# Patient Record
Sex: FEMALE | URBAN - NONMETROPOLITAN AREA
[De-identification: names, ages, dates, MRNs, and addresses within clinical notes are randomized per-mention and may not be internally consistent; named-entity substitution may affect disease eponyms.]

---

## 2024-07-08 ENCOUNTER — APPOINTMENT (OUTPATIENT)
Dept: PRIMARY CARE | Facility: CLINIC | Age: 34
End: 2024-07-08

## 2024-07-08 VITALS
SYSTOLIC BLOOD PRESSURE: 120 MMHG | WEIGHT: 224.3 LBS | BODY MASS INDEX: 30.38 KG/M2 | HEIGHT: 72 IN | DIASTOLIC BLOOD PRESSURE: 72 MMHG | HEART RATE: 59 BPM | OXYGEN SATURATION: 98 %

## 2024-07-08 DIAGNOSIS — R00.2 HEART PALPITATIONS: ICD-10-CM

## 2024-07-08 DIAGNOSIS — R53.83 OTHER FATIGUE: Primary | ICD-10-CM

## 2024-07-08 DIAGNOSIS — Z13.220 SCREENING, LIPID: ICD-10-CM

## 2024-07-08 PROCEDURE — 93000 ELECTROCARDIOGRAM COMPLETE: CPT | Performed by: NURSE PRACTITIONER

## 2024-07-08 PROCEDURE — 99204 OFFICE O/P NEW MOD 45 MIN: CPT | Performed by: NURSE PRACTITIONER

## 2024-07-08 PROCEDURE — 1036F TOBACCO NON-USER: CPT | Performed by: NURSE PRACTITIONER

## 2024-07-08 ASSESSMENT — ENCOUNTER SYMPTOMS
DIZZINESS: 0
APNEA: 0
HEADACHES: 1
SHORTNESS OF BREATH: 0
PALPITATIONS: 1

## 2024-07-08 ASSESSMENT — PATIENT HEALTH QUESTIONNAIRE - PHQ9
2. FEELING DOWN, DEPRESSED OR HOPELESS: NOT AT ALL
SUM OF ALL RESPONSES TO PHQ9 QUESTIONS 1 AND 2: 0
1. LITTLE INTEREST OR PLEASURE IN DOING THINGS: NOT AT ALL

## 2024-07-08 NOTE — PATIENT INSTRUCTIONS
We have ordered an ultrasound of your heart and a 7-day Holter monitor   Please complete your blood work when you have not had nothing to eat or drink 12 hours prior  We will arrange for you to have your Pap completed with Dr. Kitchen

## 2024-07-08 NOTE — PROGRESS NOTES
"Subjective   Patient ID: Monse Wong is a 33 y.o. female who presents for Establish Care (Establish care; has been having heart \"flutters\" x 1 month. ).    Monse comes to office, as a new patient, for concerns of heart flutters x1 MO; occurring daily (less frequently over the last MO) she will notice them when @ rest, last a few seconds & resolves spontaneously without intervention.  Fxhx: no CVD in parents or sibs.   Working out 5D/W strength training.  + fatigue  No chest pains, syncopal or pre-syncopal episodes. No dizziness. Remaining hydrated. No skipping/omitting meals.   When on menses feels lightheaded  Migraines- worse around menses or with bariatric pressures chgs; + aura with scotomas prior to onset of headache.  Treatment of migraines: Taking a nap. Caffeine: 2 C coffee daily.  Wine causes headaches. Nonsmoker.   No snoring or witnessed sleep apnea  Feels \"like a weight on the chest\"  + anxiety since children have been home  IO EKG: Normal sinus rhythm with sinus arrhythmia, 63 bpm  Last Pap>6Y ago  Preparation time on date of encounter:  5 minutes  Documentation time:  5 minutes  Counseling with patient and/or family:  20 minutes  Ordering tests:  5 minutes  Total time: 35 minutes               Review of Systems   Respiratory:  Negative for apnea and shortness of breath.    Cardiovascular:  Positive for chest pain and palpitations. Negative for leg swelling.   Neurological:  Positive for headaches. Negative for dizziness and syncope.        + Migraines       Objective   /72 (BP Location: Right arm, Patient Position: Sitting)   Pulse 59   Ht 1.816 m (5' 11.5\")   Wt 102 kg (224 lb 4.8 oz)   SpO2 98%   BMI 30.85 kg/m²     Physical Exam  Vitals and nursing note reviewed.   Constitutional:       Appearance: Normal appearance.   HENT:      Head: Normocephalic.   Neck:      Thyroid: No thyromegaly or thyroid tenderness.      Vascular: No carotid bruit.   Cardiovascular:      Rate and Rhythm: " Normal rate and regular rhythm.      Heart sounds: Normal heart sounds.   Pulmonary:      Effort: Pulmonary effort is normal.      Breath sounds: Normal breath sounds.   Abdominal:      General: Abdomen is protuberant. Bowel sounds are decreased.      Palpations: Abdomen is soft.      Tenderness: There is no abdominal tenderness.   Skin:     General: Skin is warm and dry.   Neurological:      General: No focal deficit present.      Mental Status: She is alert and oriented to person, place, and time.   Psychiatric:         Mood and Affect: Mood normal.         Thought Content: Thought content normal.         Assessment/Plan   Problem List Items Addressed This Visit             ICD-10-CM    Other fatigue - Primary R53.83     We will check labs.  Denies snoring or witnessed sleep apnea.         Relevant Orders    TSH with reflex to Free T4 if abnormal    CBC and Auto Differential    Comprehensive Metabolic Panel    Ferritin    Vitamin D 25-Hydroxy,Total (for eval of Vitamin D levels)    Vitamin B12    Heart palpitations R00.2     7-day Holter monitor and echocardiogram  Check labs  IO EKG today: Unremarkable         Relevant Orders    Transthoracic Echo Complete    Holter Or Event Cardiac Monitor    ECG 12 lead (Clinic Performed) (Completed)    Screening, lipid Z13.220    Relevant Orders    Lipid Panel

## 2024-07-09 ENCOUNTER — APPOINTMENT (OUTPATIENT)
Dept: CARDIOLOGY | Facility: HOSPITAL | Age: 34
End: 2024-07-09

## 2024-07-09 ENCOUNTER — HOSPITAL ENCOUNTER (OUTPATIENT)
Dept: CARDIOLOGY | Facility: HOSPITAL | Age: 34
Discharge: HOME | End: 2024-07-09

## 2024-07-09 DIAGNOSIS — R00.2 HEART PALPITATIONS: ICD-10-CM

## 2024-07-09 PROCEDURE — 93242 EXT ECG>48HR<7D RECORDING: CPT

## 2024-07-09 PROCEDURE — 9420000001 HC RT PATIENT EDUCATION 5 MIN

## 2024-07-11 ENCOUNTER — LAB (OUTPATIENT)
Dept: LAB | Facility: LAB | Age: 34
End: 2024-07-11

## 2024-07-11 DIAGNOSIS — Z13.220 SCREENING, LIPID: ICD-10-CM

## 2024-07-11 DIAGNOSIS — R53.83 OTHER FATIGUE: ICD-10-CM

## 2024-07-11 LAB
25(OH)D3 SERPL-MCNC: 27 NG/ML (ref 30–100)
ALBUMIN SERPL BCP-MCNC: 4.3 G/DL (ref 3.4–5)
ALP SERPL-CCNC: 48 U/L (ref 33–110)
ALT SERPL W P-5'-P-CCNC: 12 U/L (ref 7–45)
ANION GAP SERPL CALC-SCNC: 9 MMOL/L (ref 10–20)
AST SERPL W P-5'-P-CCNC: 15 U/L (ref 9–39)
BASOPHILS # BLD AUTO: 0.02 X10*3/UL (ref 0–0.1)
BASOPHILS NFR BLD AUTO: 0.3 %
BILIRUB SERPL-MCNC: 0.7 MG/DL (ref 0–1.2)
BUN SERPL-MCNC: 20 MG/DL (ref 6–23)
CALCIUM SERPL-MCNC: 8.9 MG/DL (ref 8.6–10.3)
CHLORIDE SERPL-SCNC: 106 MMOL/L (ref 98–107)
CHOLEST SERPL-MCNC: 202 MG/DL (ref 0–199)
CHOLESTEROL/HDL RATIO: 3
CO2 SERPL-SCNC: 26 MMOL/L (ref 21–32)
CREAT SERPL-MCNC: 0.74 MG/DL (ref 0.5–1.05)
EGFRCR SERPLBLD CKD-EPI 2021: >90 ML/MIN/1.73M*2
EOSINOPHIL # BLD AUTO: 0.4 X10*3/UL (ref 0–0.7)
EOSINOPHIL NFR BLD AUTO: 5.3 %
ERYTHROCYTE [DISTWIDTH] IN BLOOD BY AUTOMATED COUNT: 12.5 % (ref 11.5–14.5)
FERRITIN SERPL-MCNC: 71 NG/ML (ref 8–150)
GLUCOSE SERPL-MCNC: 76 MG/DL (ref 74–99)
HCT VFR BLD AUTO: 40 % (ref 36–46)
HDLC SERPL-MCNC: 67 MG/DL
HGB BLD-MCNC: 13.3 G/DL (ref 12–16)
IMM GRANULOCYTES # BLD AUTO: 0.02 X10*3/UL (ref 0–0.7)
IMM GRANULOCYTES NFR BLD AUTO: 0.3 % (ref 0–0.9)
LDLC SERPL CALC-MCNC: 122 MG/DL
LYMPHOCYTES # BLD AUTO: 2.17 X10*3/UL (ref 1.2–4.8)
LYMPHOCYTES NFR BLD AUTO: 28.8 %
MCH RBC QN AUTO: 31 PG (ref 26–34)
MCHC RBC AUTO-ENTMCNC: 33.3 G/DL (ref 32–36)
MCV RBC AUTO: 93 FL (ref 80–100)
MONOCYTES # BLD AUTO: 0.55 X10*3/UL (ref 0.1–1)
MONOCYTES NFR BLD AUTO: 7.3 %
NEUTROPHILS # BLD AUTO: 4.37 X10*3/UL (ref 1.2–7.7)
NEUTROPHILS NFR BLD AUTO: 58 %
NON HDL CHOLESTEROL: 135 MG/DL (ref 0–149)
NRBC BLD-RTO: 0 /100 WBCS (ref 0–0)
PLATELET # BLD AUTO: 243 X10*3/UL (ref 150–450)
POTASSIUM SERPL-SCNC: 4.3 MMOL/L (ref 3.5–5.3)
PROT SERPL-MCNC: 6.7 G/DL (ref 6.4–8.2)
RBC # BLD AUTO: 4.29 X10*6/UL (ref 4–5.2)
SODIUM SERPL-SCNC: 137 MMOL/L (ref 136–145)
TRIGL SERPL-MCNC: 64 MG/DL (ref 0–149)
TSH SERPL-ACNC: 1.09 MIU/L (ref 0.44–3.98)
VIT B12 SERPL-MCNC: 449 PG/ML (ref 211–911)
VLDL: 13 MG/DL (ref 0–40)
WBC # BLD AUTO: 7.5 X10*3/UL (ref 4.4–11.3)

## 2024-07-11 PROCEDURE — 80053 COMPREHEN METABOLIC PANEL: CPT

## 2024-07-11 PROCEDURE — 36415 COLL VENOUS BLD VENIPUNCTURE: CPT

## 2024-07-11 PROCEDURE — 82728 ASSAY OF FERRITIN: CPT

## 2024-07-11 PROCEDURE — 85025 COMPLETE CBC W/AUTO DIFF WBC: CPT

## 2024-07-11 PROCEDURE — 80061 LIPID PANEL: CPT

## 2024-07-11 PROCEDURE — 82306 VITAMIN D 25 HYDROXY: CPT

## 2024-07-11 PROCEDURE — 84443 ASSAY THYROID STIM HORMONE: CPT

## 2024-07-11 PROCEDURE — 82607 VITAMIN B-12: CPT

## 2024-07-12 ENCOUNTER — TELEPHONE (OUTPATIENT)
Dept: PRIMARY CARE | Facility: CLINIC | Age: 34
End: 2024-07-12

## 2024-07-12 NOTE — TELEPHONE ENCOUNTER
----- Message from Graciela Hardin sent at 7/12/2024 12:00 PM EDT -----  Notify patient bloodwork overall looks good except for a mildly elevated cholesterol. Choose lean meats (chicken/turkey/salmon) minimize consumption of red meat. If eating red meat choose lean as possible. Diet high in fiber and avoid foods high in t  rans & saturated fats. Try to get physical activity in lifestyle everyday such as walking  She has a low Vit D - start OTC vitD3 1000IU daily

## 2024-07-12 NOTE — RESULT ENCOUNTER NOTE
Notify patient bloodwork overall looks good except for a mildly elevated cholesterol. Choose lean meats (chicken/turkey/salmon) minimize consumption of red meat. If eating red meat choose lean as possible. Diet high in fiber and avoid foods high in trans & saturated fats. Try to get physical activity in lifestyle everyday such as walking  She has a low Vit D - start OTC vitD3 1000IU daily

## 2024-07-25 ENCOUNTER — APPOINTMENT (OUTPATIENT)
Dept: CARDIOLOGY | Facility: HOSPITAL | Age: 34
End: 2024-07-25

## 2024-07-30 ENCOUNTER — TELEPHONE (OUTPATIENT)
Dept: PRIMARY CARE | Facility: CLINIC | Age: 34
End: 2024-07-30

## 2024-08-01 ENCOUNTER — HOSPITAL ENCOUNTER (OUTPATIENT)
Dept: CARDIOLOGY | Facility: HOSPITAL | Age: 34
Discharge: HOME | End: 2024-08-01

## 2024-08-01 DIAGNOSIS — R00.2 HEART PALPITATIONS: ICD-10-CM

## 2024-08-01 LAB
AORTIC VALVE MEAN GRADIENT: 5 MMHG
AORTIC VALVE PEAK VELOCITY: 1.5 M/S
AV PEAK GRADIENT: 9 MMHG
AVA (PEAK VEL): 1.71 CM2
AVA (VTI): 1.87 CM2
EJECTION FRACTION APICAL 4 CHAMBER: 56
EJECTION FRACTION: 58 %
LEFT ATRIUM VOLUME AREA LENGTH INDEX BSA: 14.7 ML/M2
LEFT VENTRICLE INTERNAL DIMENSION DIASTOLE: 5.07 CM (ref 3.5–6)
LEFT VENTRICULAR OUTFLOW TRACT DIAMETER: 1.8 CM
LV EJECTION FRACTION BIPLANE: 57 %
MITRAL VALVE E/A RATIO: 1.44
RIGHT VENTRICLE FREE WALL PEAK S': 14.1 CM/S
TRICUSPID ANNULAR PLANE SYSTOLIC EXCURSION: 2.4 CM

## 2024-08-01 PROCEDURE — 93306 TTE W/DOPPLER COMPLETE: CPT

## 2024-08-01 PROCEDURE — 93306 TTE W/DOPPLER COMPLETE: CPT | Performed by: INTERNAL MEDICINE

## 2024-08-15 ENCOUNTER — APPOINTMENT (OUTPATIENT)
Age: 34
End: 2024-08-15

## 2024-08-26 ENCOUNTER — APPOINTMENT (OUTPATIENT)
Age: 34
End: 2024-08-26

## 2024-09-30 ENCOUNTER — APPOINTMENT (OUTPATIENT)
Age: 34
End: 2024-09-30

## 2024-09-30 ENCOUNTER — LAB (OUTPATIENT)
Facility: LAB | Age: 34
End: 2024-09-30

## 2024-09-30 VITALS
SYSTOLIC BLOOD PRESSURE: 122 MMHG | BODY MASS INDEX: 31.11 KG/M2 | WEIGHT: 222.2 LBS | HEIGHT: 71 IN | DIASTOLIC BLOOD PRESSURE: 70 MMHG | OXYGEN SATURATION: 98 % | HEART RATE: 94 BPM

## 2024-09-30 DIAGNOSIS — Z01.411 ENCOUNTER FOR GYNECOLOGICAL EXAMINATION WITH ABNORMAL FINDING: ICD-10-CM

## 2024-09-30 DIAGNOSIS — N91.2 AMENORRHEA: ICD-10-CM

## 2024-09-30 DIAGNOSIS — N91.2 AMENORRHEA: Primary | ICD-10-CM

## 2024-09-30 LAB
B-HCG SERPL-ACNC: <2 MIU/ML
PREGNANCY TEST URINE, POC: NEGATIVE

## 2024-09-30 PROCEDURE — 83001 ASSAY OF GONADOTROPIN (FSH): CPT

## 2024-09-30 PROCEDURE — 36415 COLL VENOUS BLD VENIPUNCTURE: CPT

## 2024-09-30 PROCEDURE — 84702 CHORIONIC GONADOTROPIN TEST: CPT

## 2024-09-30 PROCEDURE — 82670 ASSAY OF TOTAL ESTRADIOL: CPT

## 2024-09-30 PROCEDURE — 81025 URINE PREGNANCY TEST: CPT | Performed by: STUDENT IN AN ORGANIZED HEALTH CARE EDUCATION/TRAINING PROGRAM

## 2024-09-30 PROCEDURE — 84146 ASSAY OF PROLACTIN: CPT

## 2024-09-30 PROCEDURE — 99214 OFFICE O/P EST MOD 30 MIN: CPT | Performed by: STUDENT IN AN ORGANIZED HEALTH CARE EDUCATION/TRAINING PROGRAM

## 2024-09-30 NOTE — PROGRESS NOTES
Subjective   Monse Wong is a 33 y.o. female here for a routine exam. Current complaints: ***. Personal health questionnaire reviewed: {yes/no:9010}.     Gynecologic History  Patient's last menstrual period was 08/16/2024.  Contraception: {method:5051}  Last Pap: ***. Results were: {norm/abn:58675}  Last mammogram: ***. Results were: {norm/abn:16755}    Obstetric History  OB History   No obstetric history on file.       Objective   Physical Exam     Assessment/Plan   Healthy female exam.    {plan:40146}

## 2024-09-30 NOTE — PROGRESS NOTES
Assessment/Plan:  Monse Wong is a 33 y.o. female who presents to clinic today to address the following issues:   1. Amenorrhea  POCT Pregnancy, Urine manually resulted    FSH    Prolactin level    HCG, quantitative, pregnancy    Estradiol      2. Encounter for gynecological examination with abnormal finding          Will obtain workup for amenorrhea. POCT pregnancy test negative. Discussed that it could be too early or this could be the start of perimenopause or several other reasons. She did have a normal TSH in July. Did not obtain pap smear despite being due since patient is self pay and can likely go get it free of charge in [facility]. If unable will call our office and we will perform.   Problem List Items Addressed This Visit    None  Visit Diagnoses       Amenorrhea    -  Primary    Relevant Orders    POCT Pregnancy, Urine manually resulted (Completed)    FSH    Prolactin level    HCG, quantitative, pregnancy    Estradiol    Encounter for gynecological examination with abnormal finding                There are no Patient Instructions on file for this visit.    Follow up: 1 year    Return precautions discussed.  An After Visit Summary was given to the patient.  All questions were answered and patient in agreement with plan.    Subjective:  Monse Wong is a 33 y.o. female who presents to clinic today for Gynecologic Exam      HPI:    Gynecologic History:  (Please complete obstetric history in the history tab)  - Do you have any menstrual concerns? No, LMP 8/16/24, generally has 5ish week cycles, no hx of irregular periods, never gone 6 weeks previously,   - Do you have any breast concerns? yes, mother had breast cancer in her 40s    - Date of last pap smear: 8 years ago  - Results of last pap smear, if known: normal  - Personal history of prior abnormal pap smear?: no    - Date of last mammogram: never  - Results of last mammogram: n/a  - Personal history of prior abnormal  "mammogram: n/a    Sexual history (provider to ask):  - Have you been sexually active within the past year? yes  - What are the genders of your sexual partner(s)? male  - Are you or your spouse/partner wanting to become pregnant or have children in the next year?  Not planning it  - If no, are you currently using any method to prevent pregnancy:? withdrawal  - Do you have any questions or concerns about contraceptive access? no     - Do you have a personal history of sexually transmitted infections (STI)?: no  - Have you ever been tested for HIV? no  - Do you want comprehensive STI testing today? no    Domestic Violence Screening (Provider to ask):  Because violence is so common in many people's lives and because there is help available for those being abused, I now ask every patient about domestic violence:  - Within the past year have you been hit, slapped, kicked or otherwise physically hurt by someone? no  - Are you in a relationship with a person who threatens or physically hurts you? no  - Has anyone forced you to have sexual activities that made you feel uncomfortable? no      Review of Systems    Objective:  /70   Pulse 94   Ht 1.803 m (5' 11\")   Wt 101 kg (222 lb 3.2 oz)   LMP 08/16/2024   SpO2 98%   BMI 30.99 kg/m²     Physical Exam  Vitals and nursing note reviewed. Exam conducted with a chaperone present.   Constitutional:       General: She is not in acute distress.     Appearance: Normal appearance.   HENT:      Head: Normocephalic and atraumatic.      Mouth/Throat:      Mouth: Mucous membranes are moist.   Eyes:      General: No scleral icterus.        Right eye: No discharge.         Left eye: No discharge.      Extraocular Movements: Extraocular movements intact.      Conjunctiva/sclera: Conjunctivae normal.   Cardiovascular:      Rate and Rhythm: Normal rate and regular rhythm.   Pulmonary:      Effort: Pulmonary effort is normal. No respiratory distress.      Breath sounds: Normal " breath sounds. No wheezing.   Chest:      Chest wall: No mass, lacerations or deformity.   Breasts:     Julian Score is 5.      Breasts are symmetrical.      Right: Normal.      Left: Normal.   Abdominal:      General: Abdomen is flat. There is no distension.      Palpations: Abdomen is soft.   Genitourinary:     General: Normal vulva.      Exam position: Supine.      Pubic Area: No rash or pubic lice.       Julian stage (genital): 5.      Vagina: Normal.      Cervix: Normal.      Uterus: Normal.       Adnexa: Right adnexa normal and left adnexa normal.   Lymphadenopathy:      Upper Body:      Right upper body: No supraclavicular, axillary or pectoral adenopathy.      Left upper body: No supraclavicular, axillary or pectoral adenopathy.   Skin:     General: Skin is warm and dry.   Neurological:      General: No focal deficit present.      Mental Status: She is alert and oriented to person, place, and time.   Psychiatric:         Attention and Perception: Attention normal.         Mood and Affect: Mood normal.         Speech: Speech normal.         Behavior: Behavior normal.         Cognition and Memory: Cognition and memory normal.         Judgment: Judgment normal.         I spent 27 minutes in total time for this visit including all related clinical activities before, during, and after the visit excluding other billable activities/procedure time.     Vicky Kitchen MD

## 2024-10-01 LAB
ESTRADIOL SERPL-MCNC: 95 PG/ML
FSH SERPL-ACNC: 2.6 IU/L
PROLACTIN SERPL-MCNC: 5.2 UG/L (ref 3–20)